# Patient Record
Sex: MALE | Race: BLACK OR AFRICAN AMERICAN | NOT HISPANIC OR LATINO | URBAN - METROPOLITAN AREA
[De-identification: names, ages, dates, MRNs, and addresses within clinical notes are randomized per-mention and may not be internally consistent; named-entity substitution may affect disease eponyms.]

---

## 2020-03-31 ENCOUNTER — EMERGENCY (EMERGENCY)
Facility: HOSPITAL | Age: 30
LOS: 1 days | Discharge: HOME | End: 2020-03-31
Attending: EMERGENCY MEDICINE | Admitting: EMERGENCY MEDICINE
Payer: COMMERCIAL

## 2020-03-31 VITALS
HEART RATE: 120 BPM | HEIGHT: 74 IN | WEIGHT: 179.9 LBS | RESPIRATION RATE: 18 BRPM | DIASTOLIC BLOOD PRESSURE: 114 MMHG | OXYGEN SATURATION: 100 % | SYSTOLIC BLOOD PRESSURE: 178 MMHG | TEMPERATURE: 99 F

## 2020-03-31 VITALS
HEART RATE: 74 BPM | OXYGEN SATURATION: 100 % | DIASTOLIC BLOOD PRESSURE: 93 MMHG | RESPIRATION RATE: 18 BRPM | TEMPERATURE: 98 F | SYSTOLIC BLOOD PRESSURE: 146 MMHG

## 2020-03-31 LAB
ALBUMIN SERPL ELPH-MCNC: 4.7 G/DL — SIGNIFICANT CHANGE UP (ref 3.3–5)
ALP SERPL-CCNC: 68 U/L — SIGNIFICANT CHANGE UP (ref 40–120)
ALT FLD-CCNC: 13 U/L — SIGNIFICANT CHANGE UP (ref 10–45)
ANION GAP SERPL CALC-SCNC: 14 MMOL/L — SIGNIFICANT CHANGE UP (ref 5–17)
AST SERPL-CCNC: 15 U/L — SIGNIFICANT CHANGE UP (ref 10–40)
BILIRUB SERPL-MCNC: 0.4 MG/DL — SIGNIFICANT CHANGE UP (ref 0.2–1.2)
BUN SERPL-MCNC: 11 MG/DL — SIGNIFICANT CHANGE UP (ref 7–23)
CALCIUM SERPL-MCNC: 9.4 MG/DL — SIGNIFICANT CHANGE UP (ref 8.4–10.5)
CHLORIDE SERPL-SCNC: 104 MMOL/L — SIGNIFICANT CHANGE UP (ref 96–108)
CO2 SERPL-SCNC: 21 MMOL/L — LOW (ref 22–31)
CREAT SERPL-MCNC: 1.16 MG/DL — SIGNIFICANT CHANGE UP (ref 0.5–1.3)
D DIMER BLD IA.RAPID-MCNC: <150 NG/ML DDU — SIGNIFICANT CHANGE UP
GLUCOSE SERPL-MCNC: 94 MG/DL — SIGNIFICANT CHANGE UP (ref 70–99)
HCT VFR BLD CALC: 47.6 % — SIGNIFICANT CHANGE UP (ref 39–50)
HGB BLD-MCNC: 15.8 G/DL — SIGNIFICANT CHANGE UP (ref 13–17)
MCHC RBC-ENTMCNC: 31.5 PG — SIGNIFICANT CHANGE UP (ref 27–34)
MCHC RBC-ENTMCNC: 33.2 GM/DL — SIGNIFICANT CHANGE UP (ref 32–36)
MCV RBC AUTO: 95 FL — SIGNIFICANT CHANGE UP (ref 80–100)
NRBC # BLD: 0 /100 WBCS — SIGNIFICANT CHANGE UP (ref 0–0)
PLATELET # BLD AUTO: 268 K/UL — SIGNIFICANT CHANGE UP (ref 150–400)
POTASSIUM SERPL-MCNC: 4 MMOL/L — SIGNIFICANT CHANGE UP (ref 3.5–5.3)
POTASSIUM SERPL-SCNC: 4 MMOL/L — SIGNIFICANT CHANGE UP (ref 3.5–5.3)
PROT SERPL-MCNC: 8 G/DL — SIGNIFICANT CHANGE UP (ref 6–8.3)
RBC # BLD: 5.01 M/UL — SIGNIFICANT CHANGE UP (ref 4.2–5.8)
RBC # FLD: 13.8 % — SIGNIFICANT CHANGE UP (ref 10.3–14.5)
SODIUM SERPL-SCNC: 139 MMOL/L — SIGNIFICANT CHANGE UP (ref 135–145)
TROPONIN T SERPL-MCNC: <0.01 NG/ML — SIGNIFICANT CHANGE UP (ref 0–0.01)
WBC # BLD: 10.18 K/UL — SIGNIFICANT CHANGE UP (ref 3.8–10.5)
WBC # FLD AUTO: 10.18 K/UL — SIGNIFICANT CHANGE UP (ref 3.8–10.5)

## 2020-03-31 PROCEDURE — 84484 ASSAY OF TROPONIN QUANT: CPT

## 2020-03-31 PROCEDURE — 93005 ELECTROCARDIOGRAM TRACING: CPT

## 2020-03-31 PROCEDURE — 83735 ASSAY OF MAGNESIUM: CPT

## 2020-03-31 PROCEDURE — 99283 EMERGENCY DEPT VISIT LOW MDM: CPT | Mod: 25

## 2020-03-31 PROCEDURE — 80053 COMPREHEN METABOLIC PANEL: CPT

## 2020-03-31 PROCEDURE — 93010 ELECTROCARDIOGRAM REPORT: CPT

## 2020-03-31 PROCEDURE — 36415 COLL VENOUS BLD VENIPUNCTURE: CPT

## 2020-03-31 PROCEDURE — 85027 COMPLETE CBC AUTOMATED: CPT

## 2020-03-31 PROCEDURE — 99285 EMERGENCY DEPT VISIT HI MDM: CPT

## 2020-03-31 PROCEDURE — 71046 X-RAY EXAM CHEST 2 VIEWS: CPT | Mod: 26

## 2020-03-31 PROCEDURE — 71046 X-RAY EXAM CHEST 2 VIEWS: CPT

## 2020-03-31 PROCEDURE — 85379 FIBRIN DEGRADATION QUANT: CPT

## 2020-03-31 RX ORDER — ALBUTEROL 90 UG/1
2 AEROSOL, METERED ORAL
Qty: 1 | Refills: 0
Start: 2020-03-31 | End: 2020-04-09

## 2020-03-31 RX ORDER — SODIUM CHLORIDE 9 MG/ML
1000 INJECTION INTRAMUSCULAR; INTRAVENOUS; SUBCUTANEOUS ONCE
Refills: 0 | Status: COMPLETED | OUTPATIENT
Start: 2020-03-31 | End: 2020-03-31

## 2020-03-31 RX ADMIN — SODIUM CHLORIDE 1000 MILLILITER(S): 9 INJECTION INTRAMUSCULAR; INTRAVENOUS; SUBCUTANEOUS at 09:16

## 2020-03-31 NOTE — ED ADULT NURSE NOTE - NSIMPLEMENTINTERV_GEN_ALL_ED
Implemented All Universal Safety Interventions:  [place] to call system. Call bell, personal items and telephone within reach. Instruct patient to call for assistance. Room bathroom lighting operational. Non-slip footwear when patient is off stretcher. Physically safe environment: no spills, clutter or unnecessary equipment. Stretcher in lowest position, wheels locked, appropriate side rails in place.

## 2020-03-31 NOTE — ED PROVIDER NOTE - CLINICAL SUMMARY MEDICAL DECISION MAKING FREE TEXT BOX
This is a pleasant 29 year old male pmhx hiv (viral load undetectable) presenting to the ed with chest pain and associated Sob over the past 2 days. states associated with smoking marijuana, has not smoked in 2 days. PE patient apepars well, non-toxic. perc positive for heart rate. plan labs meds. This is a pleasant 29 year old male pmhx hiv (viral load undetectable) presenting to the ed with chest pain and associated Sob over the past 2 days. states history of "high blood pressure but I have not followed up yet". states associated with smoking marijuana, has not smoked in 2 days. PE patient apepars well, non-toxic. perc positive for heart rate. plan labs meds. EKG noted for LVH. This is a pleasant 29 year old male pmhx hiv (viral load undetectable) presenting to the ed with chest pain and associated Sob over the past 2 days. states history of "high blood pressure but I have not followed up yet". states associated with smoking marijuana, has not smoked in 2 days. PE patient apepars well, non-toxic. perc positive for heart rate. plan labs meds. EKG noted for LVH. trop/ddimer negative. At this time, the evidence for any other entities in the differential is insufficient to justify any further testing. This was discussed and explained to the patient. It was advised that persistent or worsening symptoms would require further evaluation. This was discussed with the patient and family using shared decision making. ED evaluation and management discussed with the patient and family (if available) in detail.  Close PMD follow up encouraged.  Strict ED return instructions discussed in detail and patient given the opportunity to ask any questions about their discharge diagnosis and instructions. Patient verbalized understanding. Patient is agreeable to plan.

## 2020-03-31 NOTE — ED ADULT NURSE NOTE - OBJECTIVE STATEMENT
Patient walked into triage c/o chest tightness, intermittent dry cough, intermittent SOB x 3 days. Patient lives at home with his grandmother. He denies sick contacts. Patient is an everyday cigarette smoker. He states he smokes 1 pack a day. Patient takes pmh of seasonal allergies and HIV. He presents tachycardic, hypertensive and afebrile. EKG sinus Tach.

## 2020-03-31 NOTE — ED PROVIDER NOTE - PATIENT PORTAL LINK FT
You can access the FollowMyHealth Patient Portal offered by [facility] by registering at the following website: [email or web address]. By joining [facility]’s FollowMyHealth portal, you will also be able to view your health information using other applications (apps) compatible with our system.

## 2020-03-31 NOTE — ED PROVIDER NOTE - ATTENDING CONTRIBUTION TO CARE
Patient presents to ED with concern for CP and SOB over the past several days.  Patient notes symptoms to be intermittent.  He was sent to ED for eval following visit to urgent care today.  No fam hxo f early cardiac death, no med hx in patient aside from intermittent marijuana use.  On my face to face ED eval, patient is non toxic in appearance.  HRRR, lungs clear.  Abd soft, NT/ND.  No perihperal edema, no calf tenderness.  Will check EKG, hydrate, CXR, labs and dispo accordingly.

## 2020-03-31 NOTE — ED PROVIDER NOTE - OBJECTIVE STATEMENT
on going over the past 2 days. states it typically happens "after smoking a lot of weed". no fevers chills cough. states associated chest tightness, no radiation with associated SOB. does not radiate. no associated nasuea vomiting diarrhea palpitations. states family history of HTN. no family history of sudden cardiac death, or MI. states that he felt it was "acid reflux but its not getting better so I wanted to get checked out".

## 2020-03-31 NOTE — ED ADULT NURSE NOTE - CHPI ED NUR SYMPTOMS NEG
no dizziness/no syncope/no back pain/no vomiting/no nausea/no congestion/no chills/no diaphoresis/no fever

## 2020-03-31 NOTE — ED PROVIDER NOTE - NSFOLLOWUPINSTRUCTIONS_ED_ALL_ED_FT
please take ibuprofen or tylenol for pain as needed    please follow up with cardiology    please abstain from smoking the best you can! anything helps    please come back to the emergency department for any worsening of symptoms    Chest pain can be caused by many different conditions which may or may not be dangerous. Causes include heartburn, lung infections, heart attack, blood clot in lungs, skin infections, strain or damage to muscle, cartilage, or bones, etc. In addition to a history and physical examination, an electrocardiogram (ECG) or other lab tests may have been performed to determine the cause of your chest pain. Follow up with your primary care provider or with a cardiologist as instructed.     SEEK IMMEDIATE MEDICAL CARE IF YOU HAVE ANY OF THE FOLLOWING SYMPTOMS: worsening chest pain, coughing up blood, unexplained back/neck/jaw pain, severe abdominal pain, dizziness or lightheadedness, fainting, shortness of breath, sweaty or clammy skin, vomiting, or racing heart beat. These symptoms may represent a serious problem that is an emergency. Do not wait to see if the symptoms will go away. Get medical help right away. Call 911 and do not drive yourself to the hospital.

## 2020-03-31 NOTE — ED PROVIDER NOTE - PHYSICAL EXAMINATION

## 2020-04-03 DIAGNOSIS — R07.9 CHEST PAIN, UNSPECIFIED: ICD-10-CM

## 2020-04-03 DIAGNOSIS — R07.89 OTHER CHEST PAIN: ICD-10-CM

## 2020-04-03 DIAGNOSIS — R04.2 HEMOPTYSIS: ICD-10-CM

## 2024-04-04 PROBLEM — B20 HUMAN IMMUNODEFICIENCY VIRUS [HIV] DISEASE: Chronic | Status: ACTIVE | Noted: 2020-03-31

## 2024-04-10 PROBLEM — Z00.00 ENCOUNTER FOR PREVENTIVE HEALTH EXAMINATION: Status: ACTIVE | Noted: 2024-04-10

## 2024-04-11 ENCOUNTER — RESULT REVIEW (OUTPATIENT)
Age: 34
End: 2024-04-11

## 2024-04-11 ENCOUNTER — OUTPATIENT (OUTPATIENT)
Dept: OUTPATIENT SERVICES | Facility: HOSPITAL | Age: 34
LOS: 1 days | End: 2024-04-11
Payer: OTHER MISCELLANEOUS

## 2024-04-11 ENCOUNTER — APPOINTMENT (OUTPATIENT)
Dept: ORTHOPEDIC SURGERY | Facility: CLINIC | Age: 34
End: 2024-04-11
Payer: OTHER MISCELLANEOUS

## 2024-04-11 VITALS
WEIGHT: 165 LBS | OXYGEN SATURATION: 96 % | HEIGHT: 74 IN | BODY MASS INDEX: 21.17 KG/M2 | SYSTOLIC BLOOD PRESSURE: 105 MMHG | HEART RATE: 75 BPM | DIASTOLIC BLOOD PRESSURE: 68 MMHG

## 2024-04-11 DIAGNOSIS — Z60.2 PROBLEMS RELATED TO LIVING ALONE: ICD-10-CM

## 2024-04-11 DIAGNOSIS — Z78.9 OTHER SPECIFIED HEALTH STATUS: ICD-10-CM

## 2024-04-11 DIAGNOSIS — F17.200 NICOTINE DEPENDENCE, UNSPECIFIED, UNCOMPLICATED: ICD-10-CM

## 2024-04-11 PROCEDURE — 73564 X-RAY EXAM KNEE 4 OR MORE: CPT | Mod: 26,50

## 2024-04-11 PROCEDURE — 99204 OFFICE O/P NEW MOD 45 MIN: CPT

## 2024-04-11 PROCEDURE — 73564 X-RAY EXAM KNEE 4 OR MORE: CPT

## 2024-04-11 RX ORDER — MELOXICAM 15 MG/1
15 TABLET ORAL DAILY
Qty: 30 | Refills: 1 | Status: ACTIVE | COMMUNITY
Start: 2024-04-11 | End: 1900-01-01

## 2024-04-11 SDOH — SOCIAL STABILITY - SOCIAL INSECURITY: PROBLEMS RELATED TO LIVING ALONE: Z60.2

## 2024-04-12 PROBLEM — Z78.9 EXERCISES 5 TO 6 TIMES PER WEEK: Status: ACTIVE | Noted: 2024-04-12

## 2024-04-12 PROBLEM — F17.200 CURRENT EVERY DAY SMOKER: Status: ACTIVE | Noted: 2024-04-12

## 2024-04-12 PROBLEM — Z60.2 LIVES ALONE: Status: ACTIVE | Noted: 2024-04-12

## 2024-04-12 RX ORDER — IBUPROFEN 200 MG/1
CAPSULE, LIQUID FILLED ORAL
Refills: 0 | Status: ACTIVE | COMMUNITY

## 2024-04-14 NOTE — HISTORY OF PRESENT ILLNESS
[de-identified] : GALE LOZADA is a 33 year old male who presents with left knee pain. 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 This has been happening every time he extends his knee. He notices some constant dull pain/discomfort particularly when he extends his knee.  He was seen by [facility] and has had XR of the knees. He was prescribed PT, but states that he did not go.  He was prescribed Meloxicam. Upon completion of the prescription he started taking Ibuprofen. His work is very active. Pt does not workout at home.  No antecedent trauma or injury. Has not experienced previously. Pain is anterior Pain is nonradiating. There is no associated swelling, numbness, paraesthesia or weakness. Exacerbating factors are standing, walking for prolonged periods, climbing and descending stairs, rising from seated position. Patient ambulates independently. No history of prior knee surgeries or injuries.  [FreeTextEntry2] : [employer] at a social club [FreeTextEntry6] : Standing, walking, using stairs, running [Yes] : The patient is currently working.

## 2024-04-14 NOTE — ASSESSMENT
[FreeTextEntry1] : Griffin Schilling is a 33 year old male presenting with left knee pain.  I discussed with the patient that their symptoms, signs, and imaging are most consistent with patellofemoral pain syndrome. We reviewed the natural history of this condition and treatment options ranging from conservative measures (activity modification, physical therapy, icing, oral anti-inflammatory and/or analgesic medications, steroid injection, HA gel injections, PRP injections) to surgical management. We agreed on the following plan:   XR taken and reviewed with patient today. Activity modification: low impact aerobic activity (stationary bike, elliptical, swimming) Recommend 150 min per week of moderate intensity aerobic activity Start home exercises for patellofemoral conditioning. Demonstration and handout provided.  Physical therapy. Referral provided. Medication: Meloxicam PRN refill prescription provided. Advanced imaging: consider MRI if no symptomatic improvement Follow up in 6-8 weeks.

## 2024-04-14 NOTE — DISCUSSION/SUMMARY

## 2024-04-14 NOTE — PHYSICAL EXAM
[de-identified] : General: Well-nourished, well-developed, alert, and in no acute distress. Head: Normocephalic. Eyes: Pupils equal, extraocular muscles intact, normal sclera. Nose: No nasal discharge. Cardiovascular: Extremities are warm and well perfused. Distal pulses are symmetric bilaterally. Respiratory: No labored breathing. Extremities: Sensation is intact distally bilaterally. Distal pulses are symmetric bilaterally Lymphatic: No regional lymphadenopathy, no lymphedema Neurologic: No focal deficits Skin: Normal skin color, texture, and turgor Psychiatric: Normal affect   MSK: Examination of [right] knee:   Gait non-antalgic Alignment: slight genu varum No pain with double leg squat Valgus moment with single leg squat  [Moderate] effusion No erythema, hematoma or skin lesion Nontender to palpation: medial joint line, lateral joint line, medial patellar facet, lateral patellar facet, quad tendon, patellar tendon, pes, Gerdy's tubercle, tibial tuberosity, popliteal fossa, hamstrings, ITB No warmth No Baker's cyst palpable ROM: 0-120 Mild patellar crepitus   Log roll negative Lachman negative Anterior drawer negative Posterior drawer negative Varus/valgus stress negative at 0 and 30 deg Marcie negative Patellar grind negative Thessaly negative   Examination of [left] knee:   No effusion, erythema, hematoma or skin lesion Nontender to palpation: medial joint line, lateral joint line, medial patellar facet, lateral patellar facet, quad tendon, patellar tendon, pes, Gerdy's tubercle, tibial tuberosity, popliteal fossa, hamstrings, ITB No warmth No Baker's cyst palpable ROM: 0-120 Mild patellar crepitus   Log roll negative Lachman negative Anterior drawer negative Posterior drawer negative Varus/valgus stress negative at 0 and 30 deg Marcie negative Patellar grind negative Thessaly negative   Sensation is intact to light touch over the superficial and deep peroneal nerve distributions and the posterior tibial nerve distribution. Capillary refill is less than two seconds. Posterior tibial and dorsalis pedis pulses 2+ equal bilaterally. No calf swelling or tenderness bilaterally. Strength testing shows hip flexion 5/5, hip adduction 5/5, hip abduction 5/5, knee extension 5/5, knee flexion 5/5, dorsiflexion 5/5, plantar flexion 5/5, EHL 5/5 Reflexes: Patellar 2+, Achilles 2+  [de-identified] : XR bilateral knee (4/11/24): There is no evidence of fracture or dislocation. The joint spaces are preserved. There is mild lateral patellar displacement bilaterally.

## 2024-05-23 ENCOUNTER — APPOINTMENT (OUTPATIENT)
Dept: ORTHOPEDIC SURGERY | Facility: CLINIC | Age: 34
End: 2024-05-23

## 2024-06-05 ENCOUNTER — APPOINTMENT (OUTPATIENT)
Dept: ORTHOPEDIC SURGERY | Facility: CLINIC | Age: 34
End: 2024-06-05
Payer: OTHER MISCELLANEOUS

## 2024-06-05 VITALS
HEART RATE: 73 BPM | OXYGEN SATURATION: 98 % | SYSTOLIC BLOOD PRESSURE: 120 MMHG | BODY MASS INDEX: 21.17 KG/M2 | DIASTOLIC BLOOD PRESSURE: 74 MMHG | HEIGHT: 74 IN | WEIGHT: 165 LBS

## 2024-06-05 DIAGNOSIS — M22.2X2 PATELLOFEMORAL DISORDERS, LEFT KNEE: ICD-10-CM

## 2024-06-05 PROCEDURE — 99213 OFFICE O/P EST LOW 20 MIN: CPT

## 2024-06-07 PROBLEM — M22.2X2 PATELLOFEMORAL PAIN SYNDROME OF LEFT KNEE: Status: ACTIVE | Noted: 2024-04-11

## 2024-06-07 NOTE — HISTORY OF PRESENT ILLNESS
[de-identified] : GALE LOZADA is a 34 year old male presents to follow up Last visit was 04/11/24 Pt presenting with persistent left knee pain. The patient reports that he has not yet attended physical therapy sessions as previously recommended, citing a busy work schedule as the primary reason. He acknowledges the need to start physical therapy and home exercises as advised. The patient inquires about the use of chondroitin and glucosamine supplements, noting that others have recommended them.The patient commits to attending physical therapy sessions before the next follow-up. Meloxicam, taken almost daily for knee pain. Patient continues to work despite knee pain

## 2024-06-07 NOTE — END OF VISIT
[FreeTextEntry3] : All medical record entries made by the Cristal Rodriguez were at my, Dr.Leslie Winston, direction and personally dictated by me on 06/05/2024. I have reviewed the chart and agree that the record accurately reflects my personal performance of the history, physical exam, assessment, and plan. I have also personally directed, reviewed, and agreed with the chart.

## 2024-06-07 NOTE — ADDENDUM
[FreeTextEntry1] : Documented by Cristal Rodriguez acting as a scribe for Dr. Juliana Winston. 06/05/2024.

## 2024-06-07 NOTE — ASSESSMENT
[FreeTextEntry1] : GALE LOZADA is a 34 year old male with left knee pain.     I discussed with the patient that their symptoms, signs, and imaging are most consistent with patellofemoral pain syndrome. We reviewed the natural history of this condition and treatment options. We agreed on the following plan:  Encouraged to continue home exercises per handout. Start physical therapy. Emphasize the importance of attending physical therapy sessions and encourage the patient to start as soon as possible. Recommend 150 min per week of moderate intensity aerobic activity  Medication: Continue meloxicam as needed. Imaging: Consider MRI if there is no symptomatic improvement. Patient asked about taking a supplement such as glucosamine and chondroitin supplements. Explained that these are not FDA-regulated and lack strong evidence for efficacy, though some patients report relief. Advised that physical therapy and home exercises will be more beneficial. Follow up in 6-8 weeks.

## 2024-06-07 NOTE — PHYSICAL EXAM
[de-identified] : General: Well-nourished, well-developed, alert, and in no acute distress. Head: Normocephalic. Eyes: Pupils equal, extraocular muscles intact, normal sclera. Nose: No nasal discharge. Cardiovascular: Extremities are warm and well perfused. Distal pulses are symmetric bilaterally. Respiratory: No labored breathing. Extremities: Sensation is intact distally bilaterally. Distal pulses are symmetric bilaterally Lymphatic: No regional lymphadenopathy, no lymphedema Neurologic: No focal deficits Skin: Normal skin color, texture, and turgor Psychiatric: Normal affect   MSK: Examination of [right] knee:  Gait non-antalgic Alignment: slight genu varum No pain with double leg squat Valgus moment with single leg squat [Moderate] effusion No erythema, hematoma or skin lesion Nontender to palpation: medial joint line, lateral joint line, medial patellar facet, lateral patellar facet, quad tendon, patellar tendon, pes, Gerdy's tubercle, tibial tuberosity, popliteal fossa, hamstrings, ITB No warmth No Baker's cyst palpable ROM: 0-120 Mild patellar crepitus  Log roll negative Lachman negative Anterior drawer negative Posterior drawer negative Varus/valgus stress negative at 0 and 30 deg Marcie negative Patellar grind negative Thessaly negative  Examination of [left] knee:  No effusion, erythema, hematoma or skin lesion Nontender to palpation: medial joint line, lateral joint line, medial patellar facet, lateral patellar facet, quad tendon, patellar tendon, pes, Gerdy's tubercle, tibial tuberosity, popliteal fossa, hamstrings, ITB No warmth No Baker's cyst palpable ROM: 0-120 Mild patellar crepitus  Log roll negative Lachman negative Anterior drawer negative Posterior drawer negative Varus/valgus stress negative at 0 and 30 deg Marcie negative Patellar grind negative Thessaly negative  Sensation is intact to light touch over the superficial and deep peroneal nerve distributions and the posterior tibial nerve distribution. Capillary refill is less than two seconds. Posterior tibial and dorsalis pedis pulses 2+ equal bilaterally. No calf swelling or tenderness bilaterally. Strength testing shows hip flexion 5/5, hip adduction 5/5, hip abduction 5/5, knee extension 5/5, knee flexion 5/5, dorsiflexion 5/5, plantar flexion 5/5, EHL 5/5 Reflexes: Patellar 2+, Achilles 2+

## 2024-08-08 ENCOUNTER — APPOINTMENT (OUTPATIENT)
Dept: ORTHOPEDIC SURGERY | Facility: CLINIC | Age: 34
End: 2024-08-08

## 2024-12-05 ENCOUNTER — APPOINTMENT (OUTPATIENT)
Dept: ORTHOPEDIC SURGERY | Facility: CLINIC | Age: 34
End: 2024-12-05

## 2024-12-30 RX ORDER — MELOXICAM 15 MG/1
15 TABLET ORAL
Qty: 30 | Refills: 0 | Status: ACTIVE | COMMUNITY
Start: 2024-12-30 | End: 1900-01-01

## 2025-02-19 ENCOUNTER — RX RENEWAL (OUTPATIENT)
Age: 35
End: 2025-02-19

## 2025-05-14 ENCOUNTER — APPOINTMENT (OUTPATIENT)
Dept: ORTHOPEDIC SURGERY | Facility: CLINIC | Age: 35
End: 2025-05-14
Payer: OTHER MISCELLANEOUS

## 2025-05-14 VITALS
SYSTOLIC BLOOD PRESSURE: 125 MMHG | WEIGHT: 160 LBS | BODY MASS INDEX: 20.53 KG/M2 | OXYGEN SATURATION: 96 % | HEIGHT: 74 IN | HEART RATE: 80 BPM | DIASTOLIC BLOOD PRESSURE: 75 MMHG

## 2025-05-14 DIAGNOSIS — M22.2X2 PATELLOFEMORAL DISORDERS, LEFT KNEE: ICD-10-CM

## 2025-05-14 PROCEDURE — 99214 OFFICE O/P EST MOD 30 MIN: CPT

## 2025-05-14 RX ORDER — DICLOFENAC SODIUM 10 MG/G
1 GEL TOPICAL
Qty: 1 | Refills: 0 | Status: ACTIVE | COMMUNITY
Start: 2025-05-14 | End: 1900-01-01

## 2025-08-13 ENCOUNTER — APPOINTMENT (OUTPATIENT)
Dept: ORTHOPEDIC SURGERY | Facility: CLINIC | Age: 35
End: 2025-08-13
Payer: OTHER MISCELLANEOUS

## 2025-08-13 VITALS — DIASTOLIC BLOOD PRESSURE: 76 MMHG | SYSTOLIC BLOOD PRESSURE: 112 MMHG | OXYGEN SATURATION: 95 % | HEART RATE: 85 BPM

## 2025-08-13 DIAGNOSIS — M22.2X2 PATELLOFEMORAL DISORDERS, LEFT KNEE: ICD-10-CM

## 2025-08-13 DIAGNOSIS — M22.8X2 OTHER DISORDERS OF PATELLA, LEFT KNEE: ICD-10-CM

## 2025-08-13 PROCEDURE — 99214 OFFICE O/P EST MOD 30 MIN: CPT
